# Patient Record
(demographics unavailable — no encounter records)

---

## 2025-01-07 NOTE — DATA REVIEWED
[de-identified] : entire spine radiographs were obtained  and independently reviewed during today's visit 01/07/24: spinal asymmetry of about 9' Lower extremity radiographs were obtained  and independently reviewed during today's visit 01/07/24:  mild genu varus MAD in zone 1

## 2025-01-07 NOTE — PHYSICAL EXAM
[FreeTextEntry1] : Gait: Presents ambulating independently without signs of antalgia. Good coordination and balance noted. GENERAL: alert, cooperative, in NAD SKIN: The skin is intact, warm, pink and dry over the area examined. EYES: Normal conjunctiva, normal eyelids and pupils were equal and round. ENT: normal ears, normal nose and normal lips. CARDIOVASCULAR: brisk capillary refill, but no peripheral edema. RESPIRATORY: The patient is in no apparent respiratory distress. They're taking full deep breaths without use of accessory muscles or evidence of audible wheezes or stridor without the use of a stethoscope. Normal respiratory effort. ABDOMEN: not examined  back symmetric with normal alignment of the spine. no pain with forward bending and hyperextension. 5/5 motor strength and in the main muscle groups of bilateral lower and upper extremities, sensory intact in bilateral lower and upper extremity. no pain with direct precaution, No pain with SLR normal quad and Achilles reflexes   Bilateral Knees: No bony deformities, signs of trauma, or erythema noted. No visible effusion, muscle atrophy or asymmetry. No tenderness in bony prominences or soft tissue. No joint line, MCL, LCL,patellar tendon, or quadriceps tendon tenderness. Full active and passive range of motion of the knee. Toes are warm, pink, and moving freely. Strength is 5/5. Sensation is intact to light touch distally. Patellar reflex +2 B/L. Brisk capillary refill in all toes. No joint laxity palpable. Joint is stable with varus and valgus stress. Negative Lachmann test, negative anterior and posterior drawer with solid end point. Negative Jaycee test. Negative patellar grind and patellar apprehension test. No abnormal findings on ankle or hip examination. Full and painless range of motion of the hips.

## 2025-01-07 NOTE — ASSESSMENT
[FreeTextEntry1] : Nj is a 13 year old male with mild genu varus and spinal asymmetry  -Today's assessment was performed with the assistance of the patient's parent as an independent historian given the patient's age, who could not be considered a reliable history/due to the nonverbal nature given the patient's young age. -Clinical findings and x-ray results were reviewed at length with the patient and parent. The condition, natural history, and prognosis were explained to the patient and family. spine radiographs and  Lower extremity radiographs were obtained  and independently reviewed during today's visit 01/07/24:  mild genu varus MAD in zone 1 and spinal asymmetry  -At this time, there is no acute orthopedic intervention necessary.  -We discussed at length that patient's on growth hormone have increased risk of SCFE and that if patient is to ever experience hip pain they should return for reevaluation.  -Patient is able to participate in all activities as tolerated without restrictions.  -We will see him in the clinic on annual d basis for this issue at this time or if new concerns should arise.   All questions and concerns were addressed today. Parent and patient verbalize understanding and agree with plan of care.

## 2025-01-07 NOTE — REASON FOR VISIT
[Follow Up] : a follow up visit [FreeTextEntry1] : bilateral knee pain [Patient] : patient [Mother] : mother

## 2025-01-07 NOTE — REVIEW OF SYSTEMS
[Change in Activity] : no change in activity [Fever Above 102] : no fever [Rash] : no rash [Limping] : no limping [Joint Pains] : no arthralgias [Joint Swelling] : no joint swelling [Back Pain] : ~T no back pain [Muscle Aches] : no muscle aches

## 2025-01-07 NOTE — HISTORY OF PRESENT ILLNESS
[FreeTextEntry1] : Nj is a 13 year old male presenting to the office with his father for  pediatric orthopedic evaluation of  his spine and lower extremity alignment .  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 His pain has never restricted him from participating in his typical activities. 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 He was referred here by his endocrinologist for  spine and lower extremity alignment. Patient denies any hip/knee/back  pain. Denies swelling, numbness, tingling, radiating pain, or weakness in the lower extremities. Denies recent fever or chills.